# Patient Record
Sex: FEMALE | Race: WHITE | Employment: UNEMPLOYED | URBAN - METROPOLITAN AREA
[De-identification: names, ages, dates, MRNs, and addresses within clinical notes are randomized per-mention and may not be internally consistent; named-entity substitution may affect disease eponyms.]

---

## 2017-12-30 ENCOUNTER — HOSPITAL ENCOUNTER (EMERGENCY)
Facility: HOSPITAL | Age: 42
Discharge: HOME | End: 2017-12-30
Attending: EMERGENCY MEDICINE
Payer: COMMERCIAL

## 2017-12-30 VITALS
HEIGHT: 71 IN | BODY MASS INDEX: 24.5 KG/M2 | RESPIRATION RATE: 18 BRPM | SYSTOLIC BLOOD PRESSURE: 154 MMHG | TEMPERATURE: 99 F | HEART RATE: 78 BPM | OXYGEN SATURATION: 98 % | DIASTOLIC BLOOD PRESSURE: 84 MMHG | WEIGHT: 175 LBS

## 2017-12-30 DIAGNOSIS — M62.838 NECK MUSCLE SPASM: Primary | ICD-10-CM

## 2017-12-30 PROCEDURE — 93010 ELECTROCARDIOGRAM REPORT: CPT | Performed by: EMERGENCY MEDICINE

## 2017-12-30 PROCEDURE — 93005 ELECTROCARDIOGRAM TRACING: CPT

## 2017-12-30 PROCEDURE — 99283 EMERGENCY DEPT VISIT LOW MDM: CPT

## 2017-12-30 RX ORDER — LIDOCAINE 50 MG/G
1 PATCH TOPICAL EVERY 24 HOURS
Qty: 7 PATCH | Refills: 0 | Status: SHIPPED | OUTPATIENT
Start: 2017-12-30 | End: 2018-01-06

## 2017-12-30 RX ORDER — DIAZEPAM 5 MG/1
5 TABLET ORAL EVERY 8 HOURS PRN
Qty: 15 TABLET | Refills: 0 | Status: SHIPPED | OUTPATIENT
Start: 2017-12-30 | End: 2018-01-04

## 2017-12-30 RX ORDER — MELOXICAM 7.5 MG/1
7.5 TABLET ORAL DAILY
Qty: 14 TABLET | Refills: 0 | Status: SHIPPED | OUTPATIENT
Start: 2017-12-30 | End: 2018-01-13

## 2017-12-30 NOTE — ED NOTES
Pt to er with c/o left sided neck pain after sneezing early this morning. Pt reports pain is reproducible and worse on palpation of left trapezius muscle. NO SOB, lungs cta and pt well appearing.

## 2017-12-30 NOTE — ED PROVIDER NOTES
Patient Seen in: [facility] AND [facility] Emergency Department    History   Patient presents with:  Neck Pain (musculoskeletal, neurologic)    Stated Complaint: pain in neck after sneezing and when she moves certain ways     HPI    37 yo F without PMH presneting Resp 18   Ht 180.3 cm (5' 11\")   Wt 79.4 kg   SpO2 98%   BMI 24.41 kg/m²         Physical Exam   Constitutional: No distress. HEENT: MNM. Head: Normocephalic. Eyes: No injection.    Neck: Left lateral neck discomfort to trapezius/SCM without cutaneous medication and operating macinery or driving - it may make you drowsy. )., Print Script, Disp-15 tablet, R-0    lidocaine 5 % External Patch  Place 1 patch onto the skin daily. , Normal, Disp-7 patch, R-0    Meloxicam 7.5 MG Oral Tab  Take 1 tablet (7.5 mg t

## 2017-12-30 NOTE — ED INITIAL ASSESSMENT (HPI)
Patient complains of L side neck pain immediately after/during sneezing this morning, states she has hx of previous injury to L shoulder which has caused this type of pain in the past, states she has \"chest tightness\", no apparent distress noted at this

## (undated) NOTE — ED AVS SNAPSHOT
Vicky Escobar   MRN: [id number]    Department:  [facility] Emergency Department   Date of Visit:  12/30/2017           Disclosure     Insurance plans vary and the physician(s) referred by the ER may not be covered by your plan.  Please contact CARE PHYSICIAN AT ONCE OR RETURN IMMEDIATELY TO THE EMERGENCY DEPARTMENT. If you have been prescribed any medication(s), please fill your prescription right away and begin taking the medication(s) as directed.   If you believe that any of the medications